# Patient Record
Sex: FEMALE | Race: OTHER | HISPANIC OR LATINO
[De-identification: names, ages, dates, MRNs, and addresses within clinical notes are randomized per-mention and may not be internally consistent; named-entity substitution may affect disease eponyms.]

---

## 2017-06-14 ENCOUNTER — APPOINTMENT (OUTPATIENT)
Dept: SURGERY | Facility: CLINIC | Age: 47
End: 2017-06-14

## 2017-06-14 VITALS
HEART RATE: 69 BPM | SYSTOLIC BLOOD PRESSURE: 109 MMHG | RESPIRATION RATE: 16 BRPM | OXYGEN SATURATION: 97 % | HEIGHT: 64 IN | DIASTOLIC BLOOD PRESSURE: 71 MMHG | WEIGHT: 170 LBS | BODY MASS INDEX: 29.02 KG/M2 | TEMPERATURE: 97.9 F

## 2017-06-14 DIAGNOSIS — R10.9 UNSPECIFIED ABDOMINAL PAIN: ICD-10-CM

## 2017-06-14 DIAGNOSIS — Z82.49 FAMILY HISTORY OF ISCHEMIC HEART DISEASE AND OTHER DISEASES OF THE CIRCULATORY SYSTEM: ICD-10-CM

## 2017-06-14 DIAGNOSIS — K51.90 ULCERATIVE COLITIS, UNSPECIFIED, W/OUT COMPLICATIONS: ICD-10-CM

## 2017-06-14 DIAGNOSIS — Z84.89 FAMILY HISTORY OF OTHER SPECIFIED CONDITIONS: ICD-10-CM

## 2017-06-14 RX ORDER — INFLIXIMAB 100 MG/10ML
100 INJECTION, POWDER, LYOPHILIZED, FOR SOLUTION INTRAVENOUS
Refills: 0 | Status: ACTIVE | COMMUNITY

## 2017-07-19 ENCOUNTER — OUTPATIENT (OUTPATIENT)
Dept: OUTPATIENT SERVICES | Facility: HOSPITAL | Age: 47
LOS: 1 days | End: 2017-07-19
Payer: COMMERCIAL

## 2017-07-19 VITALS
SYSTOLIC BLOOD PRESSURE: 122 MMHG | OXYGEN SATURATION: 100 % | RESPIRATION RATE: 15 BRPM | HEIGHT: 64 IN | DIASTOLIC BLOOD PRESSURE: 78 MMHG | WEIGHT: 173.06 LBS | HEART RATE: 64 BPM | TEMPERATURE: 98 F

## 2017-07-19 DIAGNOSIS — K82.8 OTHER SPECIFIED DISEASES OF GALLBLADDER: ICD-10-CM

## 2017-07-19 DIAGNOSIS — Z01.818 ENCOUNTER FOR OTHER PREPROCEDURAL EXAMINATION: ICD-10-CM

## 2017-07-19 DIAGNOSIS — C73 MALIGNANT NEOPLASM OF THYROID GLAND: ICD-10-CM

## 2017-07-19 DIAGNOSIS — K80.20 CALCULUS OF GALLBLADDER WITHOUT CHOLECYSTITIS WITHOUT OBSTRUCTION: ICD-10-CM

## 2017-07-19 DIAGNOSIS — K51.80 OTHER ULCERATIVE COLITIS WITHOUT COMPLICATIONS: ICD-10-CM

## 2017-07-19 DIAGNOSIS — Z98.890 OTHER SPECIFIED POSTPROCEDURAL STATES: Chronic | ICD-10-CM

## 2017-07-19 LAB
ALBUMIN SERPL ELPH-MCNC: 4 G/DL — SIGNIFICANT CHANGE UP (ref 3.3–5)
ALP SERPL-CCNC: 34 U/L — LOW (ref 40–120)
ALT FLD-CCNC: 16 U/L — SIGNIFICANT CHANGE UP (ref 10–45)
ANION GAP SERPL CALC-SCNC: 13 MMOL/L — SIGNIFICANT CHANGE UP (ref 5–17)
AST SERPL-CCNC: 28 U/L — SIGNIFICANT CHANGE UP (ref 10–40)
BILIRUB SERPL-MCNC: 0.3 MG/DL — SIGNIFICANT CHANGE UP (ref 0.2–1.2)
BUN SERPL-MCNC: 11 MG/DL — SIGNIFICANT CHANGE UP (ref 7–23)
CALCIUM SERPL-MCNC: 9.1 MG/DL — SIGNIFICANT CHANGE UP (ref 8.4–10.5)
CHLORIDE SERPL-SCNC: 104 MMOL/L — SIGNIFICANT CHANGE UP (ref 96–108)
CO2 SERPL-SCNC: 23 MMOL/L — SIGNIFICANT CHANGE UP (ref 22–31)
CREAT SERPL-MCNC: 0.74 MG/DL — SIGNIFICANT CHANGE UP (ref 0.5–1.3)
GLUCOSE SERPL-MCNC: 81 MG/DL — SIGNIFICANT CHANGE UP (ref 70–99)
HCT VFR BLD CALC: 31 % — LOW (ref 34.5–45)
HGB BLD-MCNC: 9.1 G/DL — LOW (ref 11.5–15.5)
MCHC RBC-ENTMCNC: 18.7 PG — LOW (ref 27–34)
MCHC RBC-ENTMCNC: 29.4 GM/DL — LOW (ref 32–36)
MCV RBC AUTO: 63.8 FL — LOW (ref 80–100)
PLATELET # BLD AUTO: 287 K/UL — SIGNIFICANT CHANGE UP (ref 150–400)
POTASSIUM SERPL-MCNC: 4.2 MMOL/L — SIGNIFICANT CHANGE UP (ref 3.5–5.3)
POTASSIUM SERPL-SCNC: 4.2 MMOL/L — SIGNIFICANT CHANGE UP (ref 3.5–5.3)
PROT SERPL-MCNC: 7.1 G/DL — SIGNIFICANT CHANGE UP (ref 6–8.3)
RBC # BLD: 4.86 M/UL — SIGNIFICANT CHANGE UP (ref 3.8–5.2)
RBC # FLD: 16.6 % — HIGH (ref 10.3–14.5)
SODIUM SERPL-SCNC: 140 MMOL/L — SIGNIFICANT CHANGE UP (ref 135–145)
WBC # BLD: 5.65 K/UL — SIGNIFICANT CHANGE UP (ref 3.8–10.5)
WBC # FLD AUTO: 5.65 K/UL — SIGNIFICANT CHANGE UP (ref 3.8–10.5)

## 2017-07-19 PROCEDURE — 80053 COMPREHEN METABOLIC PANEL: CPT

## 2017-07-19 PROCEDURE — G0463: CPT

## 2017-07-19 PROCEDURE — 85027 COMPLETE CBC AUTOMATED: CPT

## 2017-07-19 NOTE — H&P PST ADULT - HISTORY OF PRESENT ILLNESS
45 y/o female with pmh of papillary thyroid ca s/p total thyroidectomy, ulcerative colitis diagnosed in Nov 2016 now on Remicade infusions every 8 weeks, 7 months of RUQ pain radiating into her back and right shoulder - unprovoked by food. Presents for laparoscopic cholecystectomy. 45 y/o female with pmh of papillary thyroid ca s/p total thyroidectomy, ulcerative colitis diagnosed in Nov 2016 now on Remicade infusions every 8 weeks, 7 months of frequent RUQ pain radiating into her back and right shoulder - unprovoked by food. Presents for laparoscopic cholecystectomy.

## 2017-07-19 NOTE — H&P PST ADULT - NSANTHOSAYNRD_GEN_A_CORE
No. KRIS screening performed.  STOP BANG Legend: 0-2 = LOW Risk; 3-4 = INTERMEDIATE Risk; 5-8 = HIGH Risk/15 No. KRIS screening performed.  STOP BANG Legend: 0-2 = LOW Risk; 3-4 = INTERMEDIATE Risk; 5-8 = HIGH Risk/15 in

## 2017-07-19 NOTE — H&P PST ADULT - PMH
Anemia  s/p blood tx in 11/2016  Asthma  last rescue inhaler use one year ago; no hospitalizations in the past  Cardiac Murmur    Cholecystolithiasis    Heart Palpitations  occasional random episodes of palpitations  Other ulcerative colitis without complication    Thyroid Malignant Neoplasm Anemia  s/p blood tx in 11/2016  r/t UC  Asthma  last rescue inhaler use one year ago; no hospitalizations in the past  Cardiac Murmur    Cholecystolithiasis    Heart Palpitations  occasional random episodes of palpitations; feels it more when her H&H drops  Other ulcerative colitis without complication    Papillary thyroid carcinoma

## 2017-07-25 ENCOUNTER — INPATIENT (INPATIENT)
Facility: HOSPITAL | Age: 47
LOS: 0 days | Discharge: HOME | DRG: 419 | End: 2017-07-26
Attending: SURGERY | Admitting: SURGERY
Payer: COMMERCIAL

## 2017-07-25 ENCOUNTER — APPOINTMENT (OUTPATIENT)
Dept: SURGERY | Facility: HOSPITAL | Age: 47
End: 2017-07-25
Payer: COMMERCIAL

## 2017-07-25 ENCOUNTER — RESULT REVIEW (OUTPATIENT)
Age: 47
End: 2017-07-25

## 2017-07-25 VITALS
DIASTOLIC BLOOD PRESSURE: 77 MMHG | WEIGHT: 173.06 LBS | OXYGEN SATURATION: 100 % | RESPIRATION RATE: 20 BRPM | HEART RATE: 82 BPM | SYSTOLIC BLOOD PRESSURE: 112 MMHG | TEMPERATURE: 98 F | HEIGHT: 64 IN

## 2017-07-25 DIAGNOSIS — K82.8 OTHER SPECIFIED DISEASES OF GALLBLADDER: ICD-10-CM

## 2017-07-25 DIAGNOSIS — Z98.890 OTHER SPECIFIED POSTPROCEDURAL STATES: Chronic | ICD-10-CM

## 2017-07-25 LAB — HCG UR QL: NEGATIVE — SIGNIFICANT CHANGE UP

## 2017-07-25 PROCEDURE — 88304 TISSUE EXAM BY PATHOLOGIST: CPT | Mod: 26

## 2017-07-25 PROCEDURE — 88342 IMHCHEM/IMCYTCHM 1ST ANTB: CPT | Mod: 26,59

## 2017-07-25 PROCEDURE — 49585: CPT | Mod: 59

## 2017-07-25 PROCEDURE — 47562 LAPAROSCOPIC CHOLECYSTECTOMY: CPT

## 2017-07-25 PROCEDURE — 88360 TUMOR IMMUNOHISTOCHEM/MANUAL: CPT | Mod: 26

## 2017-07-25 PROCEDURE — 44970 LAPAROSCOPY APPENDECTOMY: CPT | Mod: 59

## 2017-07-25 PROCEDURE — 88302 TISSUE EXAM BY PATHOLOGIST: CPT | Mod: 26

## 2017-07-25 PROCEDURE — 88341 IMHCHEM/IMCYTCHM EA ADD ANTB: CPT | Mod: 26,59

## 2017-07-25 RX ORDER — SODIUM CHLORIDE 9 MG/ML
1000 INJECTION, SOLUTION INTRAVENOUS
Qty: 0 | Refills: 0 | Status: DISCONTINUED | OUTPATIENT
Start: 2017-07-25 | End: 2017-07-26

## 2017-07-25 RX ORDER — OXYCODONE HYDROCHLORIDE 5 MG/1
1 TABLET ORAL
Qty: 10 | Refills: 0
Start: 2017-07-25

## 2017-07-25 RX ORDER — LIDOCAINE HCL 20 MG/ML
0.2 VIAL (ML) INJECTION ONCE
Qty: 0 | Refills: 0 | Status: DISCONTINUED | OUTPATIENT
Start: 2017-07-25 | End: 2017-07-25

## 2017-07-25 RX ORDER — OXYCODONE AND ACETAMINOPHEN 5; 325 MG/1; MG/1
2 TABLET ORAL EVERY 4 HOURS
Qty: 0 | Refills: 0 | Status: DISCONTINUED | OUTPATIENT
Start: 2017-07-25 | End: 2017-07-26

## 2017-07-25 RX ORDER — HYDROMORPHONE HYDROCHLORIDE 2 MG/ML
0.5 INJECTION INTRAMUSCULAR; INTRAVENOUS; SUBCUTANEOUS
Qty: 0 | Refills: 0 | Status: DISCONTINUED | OUTPATIENT
Start: 2017-07-25 | End: 2017-07-25

## 2017-07-25 RX ORDER — OXYCODONE AND ACETAMINOPHEN 5; 325 MG/1; MG/1
1 TABLET ORAL EVERY 4 HOURS
Qty: 0 | Refills: 0 | Status: DISCONTINUED | OUTPATIENT
Start: 2017-07-25 | End: 2017-07-26

## 2017-07-25 RX ORDER — ACETAMINOPHEN 500 MG
1000 TABLET ORAL ONCE
Qty: 0 | Refills: 0 | Status: COMPLETED | OUTPATIENT
Start: 2017-07-25 | End: 2017-07-25

## 2017-07-25 RX ORDER — HYDROMORPHONE HYDROCHLORIDE 2 MG/ML
0.5 INJECTION INTRAMUSCULAR; INTRAVENOUS; SUBCUTANEOUS
Qty: 0 | Refills: 0 | Status: DISCONTINUED | OUTPATIENT
Start: 2017-07-25 | End: 2017-07-26

## 2017-07-25 RX ORDER — SODIUM CHLORIDE 9 MG/ML
3 INJECTION INTRAMUSCULAR; INTRAVENOUS; SUBCUTANEOUS EVERY 8 HOURS
Qty: 0 | Refills: 0 | Status: DISCONTINUED | OUTPATIENT
Start: 2017-07-25 | End: 2017-07-25

## 2017-07-25 RX ORDER — CEFAZOLIN SODIUM 1 G
2000 VIAL (EA) INJECTION ONCE
Qty: 0 | Refills: 0 | Status: DISCONTINUED | OUTPATIENT
Start: 2017-07-25 | End: 2017-07-26

## 2017-07-25 RX ADMIN — SODIUM CHLORIDE 3 MILLILITER(S): 9 INJECTION INTRAMUSCULAR; INTRAVENOUS; SUBCUTANEOUS at 14:14

## 2017-07-25 RX ADMIN — HYDROMORPHONE HYDROCHLORIDE 0.5 MILLIGRAM(S): 2 INJECTION INTRAMUSCULAR; INTRAVENOUS; SUBCUTANEOUS at 20:17

## 2017-07-25 RX ADMIN — HYDROMORPHONE HYDROCHLORIDE 0.5 MILLIGRAM(S): 2 INJECTION INTRAMUSCULAR; INTRAVENOUS; SUBCUTANEOUS at 21:32

## 2017-07-25 RX ADMIN — HYDROMORPHONE HYDROCHLORIDE 0.5 MILLIGRAM(S): 2 INJECTION INTRAMUSCULAR; INTRAVENOUS; SUBCUTANEOUS at 21:02

## 2017-07-25 RX ADMIN — HYDROMORPHONE HYDROCHLORIDE 0.5 MILLIGRAM(S): 2 INJECTION INTRAMUSCULAR; INTRAVENOUS; SUBCUTANEOUS at 20:30

## 2017-07-25 RX ADMIN — Medication 1000 MILLIGRAM(S): at 20:17

## 2017-07-25 RX ADMIN — Medication 400 MILLIGRAM(S): at 19:47

## 2017-07-25 RX ADMIN — HYDROMORPHONE HYDROCHLORIDE 0.5 MILLIGRAM(S): 2 INJECTION INTRAMUSCULAR; INTRAVENOUS; SUBCUTANEOUS at 20:00

## 2017-07-25 RX ADMIN — SODIUM CHLORIDE 50 MILLILITER(S): 9 INJECTION, SOLUTION INTRAVENOUS at 19:48

## 2017-07-25 RX ADMIN — HYDROMORPHONE HYDROCHLORIDE 0.5 MILLIGRAM(S): 2 INJECTION INTRAMUSCULAR; INTRAVENOUS; SUBCUTANEOUS at 19:47

## 2017-07-25 NOTE — ASU DISCHARGE PLAN (ADULT/PEDIATRIC). - MEDICATION SUMMARY - MEDICATIONS TO TAKE
I will START or STAY ON the medications listed below when I get home from the hospital:    predniSONE 10 mg oral tablet  -- 1 tab(s) by mouth once a day  -- Indication: For ulcerative colitis    acetaminophen-oxycodone 325 mg-5 mg oral tablet  -- 1-2 tab(s) by mouth every 4-6 hours, As needed, Moderate Pain (4 - 6) MDD:6  -- Indication: For pain control    Remicade 100 mg intravenous injection  --  intravenous every 8 weeks  -- Indication: For ulcerative colitis    Feosol  -- 325 milligram(s) by mouth 2 times a day  -- Indication: For iron supplementation    levothyroxine 125 mcg (0.125 mg) oral tablet  -- 1 tab(s) by mouth once a day  -- Indication: For hypothyroidism

## 2017-07-25 NOTE — BRIEF OPERATIVE NOTE - OPERATION/FINDINGS
-edematous gallbladder  -subhepatic appendix w/hyperemia  -small umbilical hernia    cystic artery and duct identified, critical view obtained, artery and duct clipped and cut, gallbladder removed; appendix found in subhepatic location and found to be slightly injected; removed with endo CARMEN purple and gray loads; small umbilical defect closed primarily

## 2017-07-25 NOTE — BRIEF OPERATIVE NOTE - POST-OP DX
Appendicitis  07/25/2017    Yareli Pedraza  Symptomatic cholelithiasis  07/25/2017    Yareli Pedraza  Umbilical hernia  07/25/2017    Yareli Pedraza

## 2017-07-25 NOTE — BRIEF OPERATIVE NOTE - PROCEDURE
Umbilical hernia repair  07/25/2017    Active  [id number]  Laparoscopic appendectomy  07/25/2017    Active  [id number]  Laparoscopic cholecystectomy  07/25/2017    Active  [id number]

## 2017-07-25 NOTE — ASU DISCHARGE PLAN (ADULT/PEDIATRIC). - YOU WERE IN THE HOSPITAL FOR:
laparoscopic cholecystectomy (removal of gallbladder), laparoscopic appendectomy (removal of appendix), repair of umbilical hernia

## 2017-07-26 ENCOUNTER — TRANSCRIPTION ENCOUNTER (OUTPATIENT)
Age: 47
End: 2017-07-26

## 2017-07-26 VITALS
TEMPERATURE: 98 F | OXYGEN SATURATION: 100 % | DIASTOLIC BLOOD PRESSURE: 62 MMHG | HEART RATE: 62 BPM | SYSTOLIC BLOOD PRESSURE: 113 MMHG | RESPIRATION RATE: 16 BRPM

## 2017-07-26 PROCEDURE — 88360 TUMOR IMMUNOHISTOCHEM/MANUAL: CPT

## 2017-07-26 PROCEDURE — 81025 URINE PREGNANCY TEST: CPT

## 2017-07-26 PROCEDURE — 88341 IMHCHEM/IMCYTCHM EA ADD ANTB: CPT

## 2017-07-26 PROCEDURE — 88302 TISSUE EXAM BY PATHOLOGIST: CPT

## 2017-07-26 PROCEDURE — 88342 IMHCHEM/IMCYTCHM 1ST ANTB: CPT

## 2017-07-26 PROCEDURE — 88304 TISSUE EXAM BY PATHOLOGIST: CPT

## 2017-07-26 RX ORDER — HEPARIN SODIUM 5000 [USP'U]/ML
5000 INJECTION INTRAVENOUS; SUBCUTANEOUS EVERY 8 HOURS
Qty: 0 | Refills: 0 | Status: DISCONTINUED | OUTPATIENT
Start: 2017-07-26 | End: 2017-07-26

## 2017-07-26 RX ORDER — FERROUS SULFATE 325(65) MG
325 TABLET ORAL DAILY
Qty: 0 | Refills: 0 | Status: DISCONTINUED | OUTPATIENT
Start: 2017-07-26 | End: 2017-07-26

## 2017-07-26 RX ORDER — LEVOTHYROXINE SODIUM 125 MCG
125 TABLET ORAL DAILY
Qty: 0 | Refills: 0 | Status: DISCONTINUED | OUTPATIENT
Start: 2017-07-26 | End: 2017-07-26

## 2017-07-26 RX ORDER — DOCUSATE SODIUM 100 MG
100 CAPSULE ORAL THREE TIMES A DAY
Qty: 0 | Refills: 0 | Status: DISCONTINUED | OUTPATIENT
Start: 2017-07-26 | End: 2017-07-26

## 2017-07-26 RX ORDER — ACETAMINOPHEN 500 MG
650 TABLET ORAL ONCE
Qty: 0 | Refills: 0 | Status: DISCONTINUED | OUTPATIENT
Start: 2017-07-26 | End: 2017-07-26

## 2017-07-26 RX ADMIN — Medication 125 MICROGRAM(S): at 06:45

## 2017-07-26 RX ADMIN — HEPARIN SODIUM 5000 UNIT(S): 5000 INJECTION INTRAVENOUS; SUBCUTANEOUS at 06:45

## 2017-07-26 RX ADMIN — OXYCODONE AND ACETAMINOPHEN 1 TABLET(S): 5; 325 TABLET ORAL at 08:48

## 2017-07-26 NOTE — DISCHARGE NOTE ADULT - MEDICATION SUMMARY - MEDICATIONS TO TAKE
I will START or STAY ON the medications listed below when I get home from the hospital:    predniSONE 10 mg oral tablet  -- 1 tab(s) by mouth once a day  -- Indication: For Steroids    acetaminophen-oxycodone 325 mg-5 mg oral tablet  -- 1-2 tab(s) by mouth every 4-6 hours, As needed, Moderate Pain (4 - 6) MDD:6  -- Indication: For Pain    Remicade 100 mg intravenous injection  --  intravenous every 8 weeks  -- Indication: For Immunosuppressive    Feosol  -- 325 milligram(s) by mouth 2 times a day  -- Indication: For Iron    levothyroxine 125 mcg (0.125 mg) oral tablet  -- 1 tab(s) by mouth once a day  -- Indication: For Hypothyroidism

## 2017-07-26 NOTE — DISCHARGE NOTE ADULT - CARE PROVIDER_API CALL
David Sheppard (MD), Surgery  [address] [facility] [place] [address]  Phone: [phone]  Fax: [phone]

## 2017-07-26 NOTE — PROGRESS NOTE ADULT - SUBJECTIVE AND OBJECTIVE BOX
DX:  Lap cholecystectomy, appendectomy, umbilical hernia repair  POD#: 1          SUBJECTIVE  Pt underwent Lap cholecystectomy, appendectomy, umbilical hernia repair, uncomplicated. Pt tolerated procedure well. Pt cleared for discharge but stayed in PACU overnight as did not feel comfortable leaving while still feeling fatigued. This AM pt denies N/V, states pain is well controlled.    10-point review of systems completed and negative except as noted above.    oxyCODONE    5 mG/acetaminophen 325 mG 1 Tablet(s) Oral every 4 hours PRN  oxyCODONE    5 mG/acetaminophen 325 mG 2 Tablet(s) Oral every 4 hours PRN  HYDROmorphone  Injectable 0.5 milliGRAM(s) IV Push every 10 minutes PRN  predniSONE   Tablet 10 milliGRAM(s) Oral daily  levothyroxine 125 MICROGram(s) Oral daily  ferrous    sulfate 325 milliGRAM(s) Oral daily  docusate sodium 100 milliGRAM(s) Oral three times a day  heparin  Injectable 5000 Unit(s) SubCutaneous every 8 hours            OBJECTIVE    T(C): 36.8 (07-26-17 @ 04:00), Max: 36.8 (07-26-17 @ 04:00)  HR: 50 (07-26-17 @ 04:00) (50 - 82)  BP: 125/68 (07-26-17 @ 04:00) (109/67 - 134/76)  RR: 16 (07-26-17 @ 04:00) (14 - 20)  SpO2: 100% (07-26-17 @ 04:00) (97% - 100%)    07-25-17 @ 07:01  -  07-26-17 @ 05:54  --------------------------------------------------------  IN: 550 mL / OUT: 200 mL / NET: 350 mL        EXAM  Gen: NAD, AAOx3  Pulm: b/l chest rise. No work on breathing  Card: RRR  Abd: soft, appropriately tender, ND. Dressings CDI.    LABS

## 2017-07-26 NOTE — DISCHARGE NOTE ADULT - CARE PLAN
Principal Discharge DX:	Umbilical hernia without obstruction and without gangrene  Goal:	Repair of umbilical hernia defect  Instructions for follow-up, activity and diet:	No diet restrictions  Secondary Diagnosis:	Appendicitis, unspecified chronicity  Goal:	Removal of appendix  Secondary Diagnosis:	Calculus of gallbladder with biliary obstruction but without cholecystitis  Goal:	Removal of gallbladder Principal Discharge DX:	Umbilical hernia without obstruction and without gangrene  Goal:	Repair of umbilical hernia defect  Instructions for follow-up, activity and diet:	Activity- No heavy lifting or straining over 15 lbs for the next two weeks;  Driving- Please do not drive until your pain is well controlled and you do not need to take pain medications.  You may shower-Do not submerge or scrub incision sites.  Please pat dry incisions/dressings.  Leave the white steri strips in place, they will fall off on their own in approximately 5-7 days.  Secondary Diagnosis:	Appendicitis, unspecified chronicity  Goal:	Removal of appendix  Secondary Diagnosis:	Calculus of gallbladder with biliary obstruction but without cholecystitis  Goal:	Removal of gallbladder

## 2017-07-26 NOTE — DISCHARGE NOTE ADULT - NS AS ACTIVITY OBS
No Heavy lifting/straining Showering allowed/No Heavy lifting/straining/Do not drive or operate machinery

## 2017-07-26 NOTE — PROGRESS NOTE ADULT - SUBJECTIVE AND OBJECTIVE BOX
[facility] GENERAL SURGERY POST-OP NOTE    SUBJECTIVE: Pt seen and evaluated at bedside. Resting comfortably in bed. Pain controlled. Denies nausea/vomiting, CP, palpitations, SOB, lightheaded, dizziness. Voiding w/ suresh. NPO. Plan was to discharge to home s/p operation but patient and family did not feel comfortable leaving.     Objective:  Gen: NAD, AAOx3  Pulm: b/l chest rise. No work on breathing  Card: RRR  Abd: soft, appropriately tender, ND. Dressings CDI.    Vital Signs Last 24 Hrs  T(C): 36.3 (25 Jul 2017 23:00), Max: 36.6 (25 Jul 2017 13:48)  T(F): 97.3 (25 Jul 2017 23:00), Max: 97.9 (25 Jul 2017 13:48)  HR: 68 (25 Jul 2017 23:00) (60 - 82)  BP: 109/67 (25 Jul 2017 23:00) (109/67 - 134/76)  BP(mean): 84 (25 Jul 2017 23:00) (84 - 99)  RR: 16 (25 Jul 2017 23:00) (14 - 20)  SpO2: 97% (25 Jul 2017 23:00) (97% - 100%)  I&O's Summary    25 Jul 2017 07:01  -  26 Jul 2017 00:02  --------------------------------------------------------  IN: 300 mL / OUT: 0 mL / NET: 300 mL      I&O's Detail    25 Jul 2017 07:01  -  26 Jul 2017 00:02  --------------------------------------------------------  IN:    lactated ringers.: 300 mL  Total IN: 300 mL    OUT:  Total OUT: 0 mL    Total NET: 300 mL          MEDICATIONS  (STANDING):  ceFAZolin   IVPB 2000 milliGRAM(s) IV Intermittent once  lactated ringers. 1000 milliLiter(s) (50 mL/Hr) IV Continuous <Continuous>    MEDICATIONS  (PRN):  oxyCODONE    5 mG/acetaminophen 325 mG 1 Tablet(s) Oral every 4 hours PRN Moderate Pain (4 - 6)  oxyCODONE    5 mG/acetaminophen 325 mG 2 Tablet(s) Oral every 4 hours PRN Severe Pain (7 - 10)  HYDROmorphone  Injectable 0.5 milliGRAM(s) IV Push every 10 minutes PRN Moderate Pain      LABS:                RADIOLOGY & ADDITIONAL STUDIES:      A/P: 47 yo lady s/p lap cholecystectomy, lap appendectomy, umbilical hernia repair in stable condition:  - Pain control  - Strict I/O's  - F/u GI fxn  - OOB/DVT ppx  - F/u AM labs

## 2017-07-26 NOTE — DISCHARGE NOTE ADULT - ADDITIONAL INSTRUCTIONS
Keep dressing dry. Additional dressing instructions: Leave umbilical dressing on for 48 hours. Do not soak incisions. Leave steri-strips intact. Remove dressing in 48 hours. Leave steri-strip intact. Please follow up with Dr. Sheppard in 2 weeks.

## 2017-07-26 NOTE — PROGRESS NOTE ADULT - ATTENDING COMMENTS
Agree with resident assessment and plan    Mild RUQ discomfort; no c/o nausea    Abd:  Soft, nondistended, min RUQ tenderness. Trocar sites clean    Plan:  Advance diet  Discharge later today when tolerating diet

## 2017-07-26 NOTE — DISCHARGE NOTE ADULT - HOSPITAL COURSE
46yoF PMH papillary thyroic ca s/p total thyroidectomy, UC on remicaide, presented with 7 month hx of frequent RUQ, cholelithiasis. Patient underwent lap cholecystectomy, appendectomy, umbilical hernia repair on 7/25. Procedure was uncomplicated. Patient tolerated procedure well. Patient stayed in PACU overnight, tolerated CLD and remained hemodynamically stable. Patient discharged on 7/26. 46yoF PMH papillary thyroic ca s/p total thyroidectomy, UC on remicaide, presented with 7 month hx of frequent RUQ, cholelithiasis. Patient underwent lap cholecystectomy, appendectomy, umbilical hernia repair on 7/25. Procedure was uncomplicated. Patient tolerated procedure well. Patient stayed in PACU overnight, tolerated CLD and remained hemodynamically stable. Diet was advanced and Patient discharged on 7/26.

## 2017-07-26 NOTE — DISCHARGE NOTE ADULT - PLAN OF CARE
Repair of umbilical hernia defect No diet restrictions Removal of appendix Removal of gallbladder Activity- No heavy lifting or straining over 15 lbs for the next two weeks;  Driving- Please do not drive until your pain is well controlled and you do not need to take pain medications.  You may shower-Do not submerge or scrub incision sites.  Please pat dry incisions/dressings.  Leave the white steri strips in place, they will fall off on their own in approximately 5-7 days.

## 2017-07-26 NOTE — PROGRESS NOTE ADULT - ASSESSMENT
47 yo lady POD 1 s/p lap cholecystectomy, lap appendectomy, umbilical hernia repair, currently hemodynamically stable.  - Pain control  - Strict I/O's  - F/u GI fxn  - OOB/DVT ppx  - Dispo: D/c today

## 2017-07-26 NOTE — DISCHARGE NOTE ADULT - PATIENT PORTAL LINK FT
“You can access the Follow[facility] Patient Portal, offered by [facility], by registering with the following website: [email or web address]”

## 2017-07-26 NOTE — DISCHARGE NOTE ADULT - SECONDARY DIAGNOSIS.
Appendicitis, unspecified chronicity Calculus of gallbladder with biliary obstruction but without cholecystitis

## 2017-08-02 LAB — SURGICAL PATHOLOGY STUDY: SIGNIFICANT CHANGE UP

## 2017-08-16 ENCOUNTER — APPOINTMENT (OUTPATIENT)
Dept: SURGERY | Facility: CLINIC | Age: 47
End: 2017-08-16
Payer: COMMERCIAL

## 2017-08-16 VITALS
HEART RATE: 68 BPM | RESPIRATION RATE: 15 BRPM | OXYGEN SATURATION: 100 % | TEMPERATURE: 98 F | DIASTOLIC BLOOD PRESSURE: 74 MMHG | SYSTOLIC BLOOD PRESSURE: 114 MMHG

## 2017-08-16 DIAGNOSIS — K82.8 OTHER SPECIFIED DISEASES OF GALLBLADDER: ICD-10-CM

## 2017-08-16 PROCEDURE — 99024 POSTOP FOLLOW-UP VISIT: CPT

## 2017-08-16 RX ORDER — ALBUTEROL SULFATE 90 UG/1
108 (90 BASE) AEROSOL, METERED RESPIRATORY (INHALATION)
Qty: 8 | Refills: 0 | Status: DISCONTINUED | COMMUNITY
Start: 2017-07-21

## 2017-08-16 RX ORDER — OXYCODONE AND ACETAMINOPHEN 5; 325 MG/1; MG/1
5-325 TABLET ORAL
Qty: 10 | Refills: 0 | Status: DISCONTINUED | COMMUNITY
Start: 2017-07-25

## 2017-08-16 RX ORDER — HYDROCORTISONE 100 MG/60ML
100 ENEMA RECTAL
Qty: 1680 | Refills: 0 | Status: DISCONTINUED | COMMUNITY
Start: 2017-03-09

## 2017-09-13 ENCOUNTER — APPOINTMENT (OUTPATIENT)
Dept: SURGERY | Facility: CLINIC | Age: 47
End: 2017-09-13
Payer: COMMERCIAL

## 2017-09-13 VITALS
TEMPERATURE: 98 F | HEART RATE: 65 BPM | SYSTOLIC BLOOD PRESSURE: 118 MMHG | DIASTOLIC BLOOD PRESSURE: 82 MMHG | OXYGEN SATURATION: 100 % | RESPIRATION RATE: 16 BRPM

## 2017-09-13 PROCEDURE — 99024 POSTOP FOLLOW-UP VISIT: CPT

## 2019-02-27 ENCOUNTER — EMERGENCY (EMERGENCY)
Facility: HOSPITAL | Age: 49
LOS: 1 days | Discharge: HOME | End: 2019-02-27
Attending: EMERGENCY MEDICINE
Payer: COMMERCIAL

## 2019-02-27 VITALS
DIASTOLIC BLOOD PRESSURE: 85 MMHG | RESPIRATION RATE: 18 BRPM | TEMPERATURE: 98 F | HEART RATE: 78 BPM | SYSTOLIC BLOOD PRESSURE: 127 MMHG | OXYGEN SATURATION: 98 % | HEIGHT: 64 IN

## 2019-02-27 DIAGNOSIS — Z98.890 OTHER SPECIFIED POSTPROCEDURAL STATES: Chronic | ICD-10-CM

## 2019-02-27 PROCEDURE — 99284 EMERGENCY DEPT VISIT MOD MDM: CPT | Mod: 25

## 2019-02-28 VITALS
SYSTOLIC BLOOD PRESSURE: 110 MMHG | TEMPERATURE: 98 F | DIASTOLIC BLOOD PRESSURE: 75 MMHG | OXYGEN SATURATION: 100 % | RESPIRATION RATE: 18 BRPM | HEART RATE: 77 BPM

## 2019-02-28 PROBLEM — K80.20 CALCULUS OF GALLBLADDER WITHOUT CHOLECYSTITIS WITHOUT OBSTRUCTION: Chronic | Status: ACTIVE | Noted: 2017-07-19

## 2019-02-28 LAB
ALBUMIN SERPL ELPH-MCNC: 4.5 G/DL — SIGNIFICANT CHANGE UP (ref 3.3–5)
ALP SERPL-CCNC: 42 U/L — SIGNIFICANT CHANGE UP (ref 40–120)
ALT FLD-CCNC: 9 U/L — LOW (ref 10–45)
ANION GAP SERPL CALC-SCNC: 13 MMOL/L — SIGNIFICANT CHANGE UP (ref 5–17)
AST SERPL-CCNC: 13 U/L — SIGNIFICANT CHANGE UP (ref 10–40)
BASOPHILS # BLD AUTO: 0.1 K/UL — SIGNIFICANT CHANGE UP (ref 0–0.2)
BASOPHILS NFR BLD AUTO: 0.7 % — SIGNIFICANT CHANGE UP (ref 0–2)
BILIRUB SERPL-MCNC: 0.3 MG/DL — SIGNIFICANT CHANGE UP (ref 0.2–1.2)
BLD GP AB SCN SERPL QL: NEGATIVE — SIGNIFICANT CHANGE UP
BUN SERPL-MCNC: 7 MG/DL — SIGNIFICANT CHANGE UP (ref 7–23)
CALCIUM SERPL-MCNC: 9.5 MG/DL — SIGNIFICANT CHANGE UP (ref 8.4–10.5)
CHLORIDE SERPL-SCNC: 102 MMOL/L — SIGNIFICANT CHANGE UP (ref 96–108)
CO2 SERPL-SCNC: 22 MMOL/L — SIGNIFICANT CHANGE UP (ref 22–31)
CREAT SERPL-MCNC: 0.69 MG/DL — SIGNIFICANT CHANGE UP (ref 0.5–1.3)
EOSINOPHIL # BLD AUTO: 0.1 K/UL — SIGNIFICANT CHANGE UP (ref 0–0.5)
EOSINOPHIL NFR BLD AUTO: 1.5 % — SIGNIFICANT CHANGE UP (ref 0–6)
GLUCOSE SERPL-MCNC: 94 MG/DL — SIGNIFICANT CHANGE UP (ref 70–99)
HCT VFR BLD CALC: 22.6 % — LOW (ref 34.5–45)
HCT VFR BLD CALC: 25.1 % — LOW (ref 34.5–45)
HGB BLD-MCNC: 7 G/DL — CRITICAL LOW (ref 11.5–15.5)
HGB BLD-MCNC: 7.6 G/DL — LOW (ref 11.5–15.5)
HYPOCHROMIA BLD QL: SLIGHT — SIGNIFICANT CHANGE UP
IRON SATN MFR SERPL: 13 UG/DL — LOW (ref 30–160)
IRON SATN MFR SERPL: 3 % — LOW (ref 14–50)
LYMPHOCYTES # BLD AUTO: 2.4 K/UL — SIGNIFICANT CHANGE UP (ref 1–3.3)
LYMPHOCYTES # BLD AUTO: 27.9 % — SIGNIFICANT CHANGE UP (ref 13–44)
MACROCYTES BLD QL: SLIGHT — SIGNIFICANT CHANGE UP
MCHC RBC-ENTMCNC: 16.9 PG — LOW (ref 27–34)
MCHC RBC-ENTMCNC: 17.3 PG — LOW (ref 27–34)
MCHC RBC-ENTMCNC: 30.3 GM/DL — LOW (ref 32–36)
MCHC RBC-ENTMCNC: 30.8 GM/DL — LOW (ref 32–36)
MCV RBC AUTO: 55.7 FL — LOW (ref 80–100)
MCV RBC AUTO: 56 FL — LOW (ref 80–100)
MICROCYTES BLD QL: SIGNIFICANT CHANGE UP
MONOCYTES # BLD AUTO: 0.9 K/UL — SIGNIFICANT CHANGE UP (ref 0–0.9)
MONOCYTES NFR BLD AUTO: 10 % — SIGNIFICANT CHANGE UP (ref 2–14)
NEUTROPHILS # BLD AUTO: 5.2 K/UL — SIGNIFICANT CHANGE UP (ref 1.8–7.4)
NEUTROPHILS NFR BLD AUTO: 59.9 % — SIGNIFICANT CHANGE UP (ref 43–77)
OVALOCYTES BLD QL SMEAR: SLIGHT — SIGNIFICANT CHANGE UP
PLAT MORPH BLD: NORMAL — SIGNIFICANT CHANGE UP
PLATELET # BLD AUTO: 367 K/UL — SIGNIFICANT CHANGE UP (ref 150–400)
PLATELET # BLD AUTO: 409 K/UL — HIGH (ref 150–400)
POTASSIUM SERPL-MCNC: 4.3 MMOL/L — SIGNIFICANT CHANGE UP (ref 3.5–5.3)
POTASSIUM SERPL-SCNC: 4.3 MMOL/L — SIGNIFICANT CHANGE UP (ref 3.5–5.3)
PROT SERPL-MCNC: 8.3 G/DL — SIGNIFICANT CHANGE UP (ref 6–8.3)
RBC # BLD: 4.04 M/UL — SIGNIFICANT CHANGE UP (ref 3.8–5.2)
RBC # BLD: 4.5 M/UL — SIGNIFICANT CHANGE UP (ref 3.8–5.2)
RBC # FLD: 18 % — HIGH (ref 10.3–14.5)
RBC # FLD: 18.1 % — HIGH (ref 10.3–14.5)
RBC BLD AUTO: ABNORMAL
RH IG SCN BLD-IMP: POSITIVE — SIGNIFICANT CHANGE UP
SCHISTOCYTES BLD QL AUTO: SLIGHT — SIGNIFICANT CHANGE UP
SODIUM SERPL-SCNC: 137 MMOL/L — SIGNIFICANT CHANGE UP (ref 135–145)
SPHEROCYTES BLD QL SMEAR: SLIGHT — SIGNIFICANT CHANGE UP
TARGETS BLD QL SMEAR: SLIGHT — SIGNIFICANT CHANGE UP
TIBC SERPL-MCNC: 436 UG/DL — HIGH (ref 220–430)
UIBC SERPL-MCNC: 423 UG/DL — HIGH (ref 110–370)
WBC # BLD: 7.5 K/UL — SIGNIFICANT CHANGE UP (ref 3.8–10.5)
WBC # BLD: 8.6 K/UL — SIGNIFICANT CHANGE UP (ref 3.8–10.5)
WBC # FLD AUTO: 7.5 K/UL — SIGNIFICANT CHANGE UP (ref 3.8–10.5)
WBC # FLD AUTO: 8.6 K/UL — SIGNIFICANT CHANGE UP (ref 3.8–10.5)

## 2019-02-28 PROCEDURE — 80053 COMPREHEN METABOLIC PANEL: CPT

## 2019-02-28 PROCEDURE — 83540 ASSAY OF IRON: CPT

## 2019-02-28 PROCEDURE — 85027 COMPLETE CBC AUTOMATED: CPT

## 2019-02-28 PROCEDURE — 99283 EMERGENCY DEPT VISIT LOW MDM: CPT

## 2019-02-28 PROCEDURE — 86850 RBC ANTIBODY SCREEN: CPT

## 2019-02-28 PROCEDURE — 86901 BLOOD TYPING SEROLOGIC RH(D): CPT

## 2019-02-28 PROCEDURE — 86900 BLOOD TYPING SEROLOGIC ABO: CPT

## 2019-02-28 PROCEDURE — 83550 IRON BINDING TEST: CPT

## 2019-02-28 NOTE — ED PROVIDER NOTE - OBJECTIVE STATEMENT
48F hx of UC on Remicade [id number], iron deficiency anemia, thyroid cancer s/p thyroidectomy on synthroid who presents for abnormal outpatient labs (Hb 6.4). Pt. had a scheduled Remicade injection today and prior to injection had labs drawn. Her GI called her due to Hb of 6.4 and suggested she present to the ED. Pt. feels that her UC symptoms have been under control, and denies diarrhea or abdominal pain. However, in regards to her iron deficiency anemia she intermittently takes iron pills which make her constipated, and have resulted in an anal fissure. Two weeks ago she noted hematochezia, however no further episodes since. Last BM this morning, brown in color. She feels fatigued which is close to baseline. Denies fevers, chills, cp, sob, abdominal pain, n/v/d, dysuria, hematuria, recent travel.     PMD - Nick Delarosa  GI - Dr. Shin Solis

## 2019-02-28 NOTE — ED ADULT NURSE NOTE - NSIMPLEMENTINTERV_GEN_ALL_ED
Implemented All Universal Safety Interventions:  [place] to call system. Call bell, personal items and telephone within reach. Instruct patient to call for assistance. Room bathroom lighting operational. Non-slip footwear when patient is off stretcher. Physically safe environment: no spills, clutter or unnecessary equipment. Stretcher in lowest position, wheels locked, appropriate side rails in place.

## 2019-02-28 NOTE — ED ADULT NURSE REASSESSMENT NOTE - NS ED NURSE REASSESS COMMENT FT1
Pt resting comfortably in bed in no apparent distress. Pt remains a&oX4, VSS. Pt spoke with her gastroenterologist on the phone and said it was OK to be discharged and to follow up with them. Awaiting dispo. safety maintained.

## 2019-02-28 NOTE — ED ADULT NURSE NOTE - OBJECTIVE STATEMENT
48y Female PMH Ulcerative Colitis on Remicade every 6 weeks, iron deficiency anemia and thyroid cancer with thyroidectomy presents to the ED c/o abnormal labs. Pt states she was at her gastroenterologists office today for Remicade injection when she has routine blood work done. Pt was called tonight for hemoglobin of 6.4 and was told to come to ED for transfusion. Pt states she has needed a transfusion in the past in 2016. Pt states that since she takes iron she gets constipated and has to strain to use the bathroom and that two weeks ago she noticed bleeding from a fissure. Pt states she bled a little bit for a week and then it resolved. Pt denies abd pain, diarrhea or blood in stool. Pt states she feels fatigued and light-headed at baseline and does not think those symptoms have gotten any worse. Pt a&oX4, ambulatory, well in appearance, airway intact, breathing spontaneously and unlabored, abd soft nondistended nontender to palpation gross neuro intact, follows commands, PERRL, skin warm dry and intact, cap refill <3 seconds, moving all extremities. Pt denies corona, CP, SOB. MD at bedside for eval. safety maintained.

## 2019-02-28 NOTE — ED PROVIDER NOTE - NSFOLLOWUPINSTRUCTIONS_ED_ALL_ED_FT
Please follow up with Dr. Solis within one week of discharge. If you develop bleeding per rectum, dark stools, stools with bright red blood, abdominal pain, chest pain, sob, please return to emergency room for further evaluation.

## 2019-02-28 NOTE — ED PROVIDER NOTE - ATTENDING CONTRIBUTION TO CARE
Afebrile. Awake and Alert. Lungs CTA. Heart RRR. Abdomen soft NTND. CN II-XII grossly intact. Moves all extremities without lateralization. Last blood streak BM 2 weeks ago. No lightheadedness, CP, SOB. Afebrile. Awake and Alert. Lungs CTA. Heart RRR. Abdomen soft NTND. CN II-XII grossly intact. Moves all extremities without lateralization. Last blood streak BM 2 weeks ago. No lightheadedness, CP, SOB. Hgb 7.6 baseline 7-9 per MR. Afebrile. Awake and Alert. Lungs CTA. Heart RRR. Abdomen soft NTND. CN II-XII grossly intact. Moves all extremities without lateralization. Last blood streak BM 2 weeks ago. No lightheadedness, CP, SOB. No active bleeding. Hgb 7.6 baseline 7-9 per MR. Discussed pros and cons of pRBC transfusion in setting of Hgb around 7 and near baseline, no active bleeding and HD stable, which pt also d/w her GI via phone, will defer pRBC at this time.

## 2019-02-28 NOTE — ED PROVIDER NOTE - CARE PROVIDER_API CALL
Shin Solis)  Gastroenterology  [facility], [address] [place] [address]  Phone: [phone]  Fax: [phone]  Follow Up Time:

## 2019-02-28 NOTE — ED PROVIDER NOTE - CLINICAL SUMMARY MEDICAL DECISION MAKING FREE TEXT BOX
48F hx of UC on Remicade q4wafce, iron deficiency anemia, thyroid cancer s/p thyroidectomy on synthroid who presents for abnormal outpatient labs (Hb 6.4). VSS. PE unremarkable. CBC and type and screen. 48F hx of UC on Remicade [id number], iron deficiency anemia, thyroid cancer s/p thyroidectomy on synthroid who presents for abnormal outpatient labs (Hb 6.4). VSS. PE unremarkable. Hb 7.6, repeat 7.0. Pt. discussed with outpatient GI, no need for transfusion, will follow up with repeat labs outpatient next week.

## 2019-02-28 NOTE — ED PROVIDER NOTE - PMH
Anemia  s/p blood tx in 11/2016  r/t UC  Asthma  last rescue inhaler use one year ago; no hospitalizations in the past  Cardiac Murmur    Cholecystolithiasis    Heart Palpitations  occasional random episodes of palpitations; feels it more when her H&H drops  Other ulcerative colitis without complication    Papillary thyroid carcinoma

## 2019-03-01 NOTE — ED POST DISCHARGE NOTE - ADDITIONAL DOCUMENTATION
Spoke with pt, she already knew the resuly followed up with Heme yesterday and is about to start iron infusions.  Micky

## 2019-03-01 NOTE — ED POST DISCHARGE NOTE - RESULT SUMMARY
Fe 13 (L), Unsaturated iron binding capacity 423 (H), TIBC 436 (H), %Fe sat 3 (L)- c/w Fe deficiency. In the setting of low h/h, known Fe deficient anemia intermittently uses iron pills due to constipation and anal fissure. Would call and inform pt, emphasize importance of f/u with Dr. Solis in 1 week, return to the ED if new/worsening sxs or concerns.

## 2019-09-24 NOTE — ASU DISCHARGE PLAN (ADULT/PEDIATRIC). - RN NAME (PRINT)_____________________________________________
Encounter Date: 9/24/2019    SCRIBE #1 NOTE: I, Monster Maki, am scribing for, and in the presence of,  Ananda Martell MD. I have scribed the following portions of the note - Other sections scribed: HPI, ROS, PE.       History     Chief Complaint   Patient presents with    Emesis     EMS reports pt has hx of ETOH abuse, been vomiting blood since this AM. Pt has associated weakness.      CC: Emesis    HPI: 66 y/o female, with a PMHx of EtOH abuse, GERD, thyroid disease, cirrhosis, and hepatitis C, presents to the ED c/o emesis. Pt reports that the emesis started last night. Pt states that she is vomiting blood and clots. Pt notes that she has experienced blood-tinged vomit before, but never pure blood. Additionally, pt adds that she is experiencing associated nausea. Pt denies attempting prior tx.     The history is provided by the patient. No  was used.     Review of patient's allergies indicates:  No Known Allergies  Past Medical History:   Diagnosis Date    Addiction to drug     Alcohol abuse     Arthritis     Cirrhosis of liver     Colon polyp     Coronary artery disease     Fall     GERD (gastroesophageal reflux disease)     Hepatitis C     States was successfully treated with Harvoni    History of psychiatric hospitalization     Hx of psychiatric care     Hypertension     Low back pain     Radha     MI (myocardial infarction)     Migraine headache     Psychiatric problem     Sleep difficulties     Suicide attempt     Therapy     Thyroid disease      Past Surgical History:   Procedure Laterality Date    ESOPHAGOGASTRODUODENOSCOPY N/A 3/20/2019    Procedure: EGD (ESOPHAGOGASTRODUODENOSCOPY);  Surgeon: Obdulia Wilson MD;  Location: [facility];  Service: Endoscopy;  Laterality: N/A;    HERNIA REPAIR      HIATAL HERNIA REPAIR      JOINT REPLACEMENT Bilateral     KNEE SURGERY  bilateral replacement    right foot sx  2008    SHOULDER SURGERY  replacement      Family History   Problem Relation Age of Onset    Cancer Mother     Cancer Father     Depression Sister     Bipolar disorder Maternal Grandfather     Suicide Cousin      Social History     Tobacco Use    Smoking status: Never Smoker    Smokeless tobacco: Never Used   Substance Use Topics    Alcohol use: Yes     Alcohol/week: 6.0 standard drinks     Types: 6 Cans of beer per week     Comment: pt admits to drinking vodka daily    Drug use: Not Currently     Types: Benzodiazepines, Methamphetamines, Amphetamines     Comment: [facility] clinic; admits to addiction to opioids     Review of Systems   Constitutional: Negative.    HENT: Negative.    Eyes: Negative.    Respiratory: Negative.    Cardiovascular: Negative.    Gastrointestinal: Positive for nausea and vomiting (blood and clots).   Genitourinary: Negative.    Musculoskeletal: Negative.    Skin: Negative.    Neurological: Negative.        Physical Exam     Initial Vitals [09/24/19 1729]   BP Pulse Resp Temp SpO2   (!) 160/90 (!) 120 20 97.9 °F (36.6 °C) 100 %      MAP       --         Physical Exam    Constitutional: She appears well-developed and well-nourished. She is not diaphoretic. No distress.   HENT:   Head: Normocephalic and atraumatic.   Nose: Nose normal.   Mouth/Throat: Oropharynx is clear and moist.   Eyes: EOM are normal. Pupils are equal, round, and reactive to light.   Neck: Normal range of motion. Neck supple. No JVD present.   Cardiovascular: Normal rate, regular rhythm and normal heart sounds.   No murmur heard.  Pulmonary/Chest: Breath sounds normal. No stridor. No respiratory distress. She has no wheezes. She has no rales.   Abdominal: Soft. Bowel sounds are normal. She exhibits no distension. There is tenderness in the epigastric area.   Musculoskeletal: Normal range of motion. She exhibits no edema or tenderness.   Neurological: She is alert and oriented to person, place, and time. No cranial nerve deficit.   Skin: Skin is warm and  dry. Capillary refill takes less than 2 seconds. No rash noted. No erythema.         ED Course   Procedures  Labs Reviewed   CBC W/ AUTO DIFFERENTIAL - Abnormal; Notable for the following components:       Result Value    WBC 2.34 (*)     Hemoglobin 11.6 (*)     Hematocrit 35.9 (*)     RDW 18.3 (*)     Lymph # 0.2 (*)     Mono # 0.2 (*)     Gran% 82.1 (*)     Lymph% 9.4 (*)     All other components within normal limits   COMPREHENSIVE METABOLIC PANEL - Abnormal; Notable for the following components:    Potassium 3.3 (*)     Glucose 153 (*)     Total Protein 9.0 (*)     Alkaline Phosphatase 231 (*)     AST 44 (*)     All other components within normal limits   URINALYSIS, REFLEX TO URINE CULTURE - Abnormal; Notable for the following components:    Protein, UA 1+ (*)     Glucose, UA 1+ (*)     Nitrite, UA Positive (*)     Urobilinogen, UA 4.0-6.0 (*)     All other components within normal limits    Narrative:     Preferred Collection Type->Urine, Clean Catch   LACTIC ACID, PLASMA - Abnormal; Notable for the following components:    Lactate (Lactic Acid) 3.2 (*)     All other components within normal limits   URINALYSIS MICROSCOPIC - Abnormal; Notable for the following components:    Bacteria Many (*)     Hyaline Casts, UA 2 (*)     All other components within normal limits    Narrative:     Preferred Collection Type->Urine, Clean Catch   ALCOHOL,MEDICAL (ETHANOL)   DRUG SCREEN PANEL, URINE EMERGENCY    Narrative:     Preferred Collection Type->Urine, Clean Catch   LIPASE   TROPONIN I   LACTIC ACID, PLASMA   TYPE & SCREEN   RH TYPING   ABO GROUP          Imaging Results          X-Ray Chest 1 View (Final result)  Result time 09/24/19 18:09:58    Final result by Paula Santos MD (09/24/19 18:09:58)                 Impression:      No acute cardiopulmonary process identified.      Electronically signed by: Paula Santos MD  Date:    09/24/2019  Time:    18:09             Narrative:    EXAMINATION:  XR CHEST 1  "VIEW    TECHNIQUE:  Single frontal view of the chest was performed.    COMPARISON:  08/27/2019.    FINDINGS:  Cardiac silhouette is normal in size.  Lungs are slightly hypoinflated.  No evidence of focal consolidative process, pneumothorax, or significant effusion.  No acute osseous abnormality identified.  Postsurgical changes are seen involving the right shoulder.                                   MDM:    68 y/o female, with a PMHx of EtOH abuse, GERD, thyroid disease, cirrhosis, and hepatitis C, presents to the ED c/o emesis.  Physical exam remarkable for moderately distressed appearing female, conversing with ease, abdomen soft, nt/nd, CTAB, tachycardic.  ED workup remarkable for Hgb - 11.6, CMP: K - 3.3, ETOH - negative, Lipase - 53, LA - 3.2, trop - negative, CXR - unremarkable. Pt presentation consistent with vomiting, suspect chronic process, with bright red blood in bag, consistent with errol-wolfe tear, not large volume hematemesis, with multiple prior admissions and ED visits for similar demanding pain medication and that she is in "DTs".  Pt observed for > 4.5 hrs in the ED without BM or further episode of vomiting with coffee-ground emesis noted.  Pt given pain medicaiton initially, suspect tachycardia 2/2 medication non-compliance and given home atenolol.  Discussed blood counts with patient and need for f/u with her PCP at this time, as there is nothing acute going on.  Pt given reglan tablets for further use at home for her nausea.  At this time given patient's history, physical exam, and ED workup do not suspect variceal bleed, acute GI blood loss, MI/ACS, PE, CVA/TIA, ETOH withdrawal, or any further malignant cause.  Discussed diagnosis and further treatment with patient, patient admitted to observation for further treatment.          Scribe Attestation:   Scribe #1: I performed the above scribed service and the documentation accurately describes the services I performed. I attest to the accuracy " of the note.               Clinical Impression:       ICD-10-CM ICD-9-CM   1. Intractable vomiting with nausea, unspecified vomiting type R11.2 536.2   2. Vomiting R11.10 787.03                   I, Ananda Martell M.D., personally performed the services described in this documentation. All medical record entries made by the scribe were at my direction and in my presence.  I have reviewed the chart and agree that the record reflects my personal performance and is accurate and complete.             Ananda Martell MD  09/24/19 2418     Statement Selected

## 2019-10-22 ENCOUNTER — APPOINTMENT (OUTPATIENT)
Dept: SURGERY | Facility: CLINIC | Age: 49
End: 2019-10-22
Payer: COMMERCIAL

## 2019-10-22 VITALS
OXYGEN SATURATION: 100 % | WEIGHT: 138 LBS | DIASTOLIC BLOOD PRESSURE: 83 MMHG | HEART RATE: 68 BPM | HEIGHT: 64 IN | SYSTOLIC BLOOD PRESSURE: 118 MMHG | BODY MASS INDEX: 23.56 KG/M2 | RESPIRATION RATE: 15 BRPM

## 2019-10-22 PROCEDURE — 99243 OFF/OP CNSLTJ NEW/EST LOW 30: CPT

## 2019-10-22 RX ORDER — LEVOTHYROXINE SODIUM 112 UG/1
112 TABLET ORAL
Refills: 0 | Status: ACTIVE | COMMUNITY

## 2019-10-22 RX ORDER — PREDNISOLONE ACETATE 10 MG/ML
1 SUSPENSION/ DROPS OPHTHALMIC
Refills: 0 | Status: ACTIVE | COMMUNITY

## 2019-10-22 RX ORDER — MESALAMINE 1.2 G/1
1.2 TABLET, DELAYED RELEASE ORAL
Qty: 90 | Refills: 0 | Status: DISCONTINUED | COMMUNITY
Start: 2017-05-12 | End: 2019-10-22

## 2019-10-22 RX ORDER — PREDNISONE 50 MG/1
TABLET ORAL
Refills: 0 | Status: DISCONTINUED | COMMUNITY
End: 2019-10-22

## 2019-10-22 RX ORDER — LEVOTHYROXINE SODIUM 125 UG/1
125 TABLET ORAL
Qty: 90 | Refills: 0 | Status: DISCONTINUED | COMMUNITY
Start: 2017-03-29 | End: 2019-10-22

## 2019-10-22 NOTE — REASON FOR VISIT
[Initial Evaluation] : an initial evaluation [FreeTextEntry1] : Lipoma on right side of the lower back.

## 2019-10-22 NOTE — ASSESSMENT
[FreeTextEntry1] : I discussed resection of this lipoma of the lower back with the patient. She understands will be done in the  facility at [facility] and be done under intravenous sedation and local.\par Risks benefits and alternatives were discussed with the patient. I told her that the major risks to include bleeding and infection..

## 2019-10-22 NOTE — PHYSICAL EXAM
[Normal Breath Sounds] : Normal breath sounds [Normal Heart Sounds] : normal heart sounds [Normal Rate and Rhythm] : normal rate and rhythm [Oriented to Place] : oriented to place [Oriented to Person] : oriented to person [Alert] : alert [Oriented to Time] : oriented to time [Calm] : calm [JVD] : no jugular venous distention  [de-identified] : Well nourished female, in no apparent distress [de-identified] : WNL [de-identified] : Full ROM [de-identified] : There is a 5 x 6 x 3 cm lipoma of the lower back.

## 2019-10-22 NOTE — HISTORY OF PRESENT ILLNESS
[de-identified] : Virgil is a 49 y/o female here for evaluation of lipoma of the back. He patient states that recently there has been a change in the size of this lipoma. It causes no pain.

## 2019-10-22 NOTE — CONSULT LETTER
[Dear  ___] : Dear  [unfilled], [Consult Letter:] : I had the pleasure of evaluating your patient, [unfilled]. [Please see my note below.] : Please see my note below. [Consult Closing:] : Thank you very much for allowing me to participate in the care of this patient.  If you have any questions, please do not hesitate to contact me. [Sincerely,] : Sincerely, [FreeTextEntry3] : I have reviewed all the documentation for this encounter with the patient and have edited where appropriate\par \par Dr. Kp Corral

## 2019-11-13 ENCOUNTER — OUTPATIENT (OUTPATIENT)
Dept: OUTPATIENT SERVICES | Facility: HOSPITAL | Age: 49
LOS: 1 days | End: 2019-11-13
Payer: COMMERCIAL

## 2019-11-13 VITALS
SYSTOLIC BLOOD PRESSURE: 106 MMHG | TEMPERATURE: 98 F | DIASTOLIC BLOOD PRESSURE: 78 MMHG | HEIGHT: 64 IN | RESPIRATION RATE: 76 BRPM | HEART RATE: 73 BPM | WEIGHT: 134.04 LBS | OXYGEN SATURATION: 100 %

## 2019-11-13 DIAGNOSIS — Z98.890 OTHER SPECIFIED POSTPROCEDURAL STATES: Chronic | ICD-10-CM

## 2019-11-13 DIAGNOSIS — D17.1 BENIGN LIPOMATOUS NEOPLASM OF SKIN AND SUBCUTANEOUS TISSUE OF TRUNK: ICD-10-CM

## 2019-11-13 DIAGNOSIS — D17.9 BENIGN LIPOMATOUS NEOPLASM, UNSPECIFIED: ICD-10-CM

## 2019-11-13 DIAGNOSIS — E03.9 HYPOTHYROIDISM, UNSPECIFIED: ICD-10-CM

## 2019-11-13 DIAGNOSIS — J32.9 CHRONIC SINUSITIS, UNSPECIFIED: ICD-10-CM

## 2019-11-13 DIAGNOSIS — Z01.818 ENCOUNTER FOR OTHER PREPROCEDURAL EXAMINATION: ICD-10-CM

## 2019-11-13 LAB
ANION GAP SERPL CALC-SCNC: 12 MMOL/L — SIGNIFICANT CHANGE UP (ref 5–17)
BUN SERPL-MCNC: 6 MG/DL — LOW (ref 7–23)
CALCIUM SERPL-MCNC: 9.1 MG/DL — SIGNIFICANT CHANGE UP (ref 8.4–10.5)
CHLORIDE SERPL-SCNC: 99 MMOL/L — SIGNIFICANT CHANGE UP (ref 96–108)
CO2 SERPL-SCNC: 26 MMOL/L — SIGNIFICANT CHANGE UP (ref 22–31)
CREAT SERPL-MCNC: 0.66 MG/DL — SIGNIFICANT CHANGE UP (ref 0.5–1.3)
GLUCOSE SERPL-MCNC: 96 MG/DL — SIGNIFICANT CHANGE UP (ref 70–99)
HCT VFR BLD CALC: 34.3 % — LOW (ref 34.5–45)
HGB BLD-MCNC: 10.6 G/DL — LOW (ref 11.5–15.5)
MCHC RBC-ENTMCNC: 23.1 PG — LOW (ref 27–34)
MCHC RBC-ENTMCNC: 30.9 GM/DL — LOW (ref 32–36)
MCV RBC AUTO: 74.7 FL — LOW (ref 80–100)
PLATELET # BLD AUTO: 278 K/UL — SIGNIFICANT CHANGE UP (ref 150–400)
POTASSIUM SERPL-MCNC: 3.8 MMOL/L — SIGNIFICANT CHANGE UP (ref 3.5–5.3)
POTASSIUM SERPL-SCNC: 3.8 MMOL/L — SIGNIFICANT CHANGE UP (ref 3.5–5.3)
RBC # BLD: 4.59 M/UL — SIGNIFICANT CHANGE UP (ref 3.8–5.2)
RBC # FLD: 13.9 % — SIGNIFICANT CHANGE UP (ref 10.3–14.5)
SODIUM SERPL-SCNC: 137 MMOL/L — SIGNIFICANT CHANGE UP (ref 135–145)
WBC # BLD: 7.89 K/UL — SIGNIFICANT CHANGE UP (ref 3.8–10.5)
WBC # FLD AUTO: 7.89 K/UL — SIGNIFICANT CHANGE UP (ref 3.8–10.5)

## 2019-11-13 PROCEDURE — 80048 BASIC METABOLIC PNL TOTAL CA: CPT

## 2019-11-13 PROCEDURE — G0463: CPT

## 2019-11-13 PROCEDURE — 85027 COMPLETE CBC AUTOMATED: CPT

## 2019-11-13 RX ORDER — LIDOCAINE HCL 20 MG/ML
0.2 VIAL (ML) INJECTION ONCE
Refills: 0 | Status: DISCONTINUED | OUTPATIENT
Start: 2019-12-18 | End: 2020-01-03

## 2019-11-13 RX ORDER — INFLIXIMAB-DYYB 120 MG/ML
0 INJECTION SUBCUTANEOUS
Qty: 0 | Refills: 0 | DISCHARGE

## 2019-11-13 RX ORDER — LEVOTHYROXINE SODIUM 125 MCG
1 TABLET ORAL
Qty: 0 | Refills: 0 | DISCHARGE

## 2019-11-13 RX ORDER — SODIUM CHLORIDE 9 MG/ML
3 INJECTION INTRAMUSCULAR; INTRAVENOUS; SUBCUTANEOUS EVERY 8 HOURS
Refills: 0 | Status: DISCONTINUED | OUTPATIENT
Start: 2019-12-18 | End: 2020-01-03

## 2019-11-13 NOTE — H&P PST ADULT - HISTORY OF PRESENT ILLNESS
45 y/o female with pmh of papillary thyroid ca s/p total thyroidectomy, ulcerative colitis diagnosed in Nov 2016 now on Remicade infusions every 8 weeks, 7 months of frequent RUQ pain radiating into her back and right shoulder - unprovoked by food. Presents for laparoscopic cholecystectomy. 50 yo female, PMH papillary thyroid CA s/p total thyroidectomy 11/2018, asthma - well controlled, mild KRIS - no CPAP required, laparoscopic cholecystectomy appendectomy, umbilical hernia surgery 7/2017, chronic anemia - received PRBC 2016, frequent iron infusions by hematologist, ulcerative colitis with acute episode 9/2019 and accompanied by iritis - presently on prednisone - no further symptoms, "pressure headaches" diagnosed with sinusitis 11/6 and under the care of ENT - presently on antibiotics. Pt. with lipoma for a few years that has been growing in size ~7X4 cm on right lower back, presents to PST for scheduled Excision of lipoma of the back on 11/20/19. Pt. denies fever, chills, chest pain, SOB, changes in bowel/urinary habits.    Pt. will see ENT pre-op for medical evaluation before surgery 48 yo female, PMH papillary thyroid CA s/p total thyroidectomy 11/2018, asthma - well controlled, mild KRIS - no CPAP required, laparoscopic cholecystectomy appendectomy, umbilical hernia surgery 7/2017, chronic anemia - received PRBC 2016, frequent iron infusions by hematologist, ulcerative colitis with acute episode 9/2019 and accompanied by iritis -  on prednisone - no further symptoms, "pressure headaches" diagnosed with sinusitis 11/6 and on antibiotics. Pt. with lipoma for a few years that has been growing in size ~7X4 cm on right lower back, presents to PST for scheduled Excision of lipoma of the back on 11/20/19. Pt.'s sinus symptoms have subsided, denies fever, chills, chest pain, SOB, changes in bowel/urinary habits.

## 2019-11-13 NOTE — H&P PST ADULT - NEGATIVE MUSCULOSKELETAL SYMPTOMS
no arthritis/no neck pain/no back pain no arthritis/no stiffness/no neck pain/no joint swelling/no back pain

## 2019-11-13 NOTE — H&P PST ADULT - OTHER CARE PROVIDERS
Endo [address] [place] [address]  Dr. Shin Solis, GI, [phone], Dr. Ondina Hoang, endo, [phone], Dr. Rudolph Gerber, hematologist

## 2019-11-13 NOTE — H&P PST ADULT - NEGATIVE ENMT SYMPTOMS
no dry mouth/no throat pain/no nose bleeds/no dysphagia/no nasal congestion no nose bleeds/no dry mouth/no tinnitus/no ear pain/no throat pain/no dysphagia/no vertigo/no hearing difficulty/no nasal congestion

## 2019-11-13 NOTE — H&P PST ADULT - LYMPHATIC
anterior cervical L/anterior cervical R/supraclavicular R/supraclavicular L anterior cervical L/anterior cervical R

## 2019-11-13 NOTE — H&P PST ADULT - GASTROINTESTINAL DETAILS
no distention/soft/nontender/bowel sounds normal bowel sounds normal/no distention/nontender/soft/no masses palpable

## 2019-11-13 NOTE — H&P PST ADULT - NSICDXPASTSURGICALHX_GEN_ALL_CORE_FT
PAST SURGICAL HISTORY:  S/P thyroid surgery thyroidectomy due to thyroid cancer  2010 PAST SURGICAL HISTORY:  S/P laparoscopic surgery cholecystectomym, appendectomy, umbilical hernia 7/2017    S/P thyroid surgery thyroidectomy due to thyroid cancer  2010

## 2019-11-13 NOTE — H&P PST ADULT - NEGATIVE OPHTHALMOLOGIC SYMPTOMS
no diplopia/no photophobia no blurred vision L/no discharge L/no blurred vision R/no discharge R/no diplopia/no photophobia

## 2019-11-13 NOTE — H&P PST ADULT - NSICDXPASTMEDICALHX_GEN_ALL_CORE_FT
PAST MEDICAL HISTORY:  Anemia s/p blood tx in 11/2016  r/t UC    Asthma last rescue inhaler use one year ago; no hospitalizations in the past    Cardiac Murmur     Cholecystolithiasis     Heart Palpitations occasional random episodes of palpitations; feels it more when her H&H drops    Other ulcerative colitis without complication     Papillary thyroid carcinoma PAST MEDICAL HISTORY:  Anemia s/p blood tx in 11/2016  r/t UC    Asthma last rescue inhaler 9/2018; no hospitalizations in the past    Cardiac Murmur no symptoms    Cholecystolithiasis     Heart Palpitations occasional random episodes of palpitations; feels it more when her H&H drops    KRIS (obstructive sleep apnea) mild, no CPAP required    Other ulcerative colitis without complication     Papillary thyroid carcinoma 2010

## 2019-11-13 NOTE — H&P PST ADULT - NSICDXPROBLEM_GEN_ALL_CORE_FT
PROBLEM DIAGNOSES  Problem: Lipoma  Assessment and Plan: Excision of lipoma of the back  Pre-op instructions, including Chlorhexidine soap, provided - all questions answered    Problem: Hypothyroid  Assessment and Plan: Continue Synthroid as directed, including am of surgery with sip of water    Problem: Sinusitis  Assessment and Plan: Pt. will see ENT pre-op for medical evaluation before surgery PROBLEM DIAGNOSES  Problem: Lipoma  Assessment and Plan: Excision of lipoma of the back  Pre-op instructions, including Chlorhexidine soap, provided - all questions answered    Problem: Hypothyroid  Assessment and Plan: Continue Synthroid as directed, including am of surgery with sip of water

## 2019-11-13 NOTE — H&P PST ADULT - PRIMARY CARE PROVIDER
Dr. Delarosa [phone] Dr. Delarosa [phone], Dr. Ángel Ortiz,  [phone] - will give m/e this week Dr. Delarosa [phone], Dr. Ángel Ortiz,  [phone]

## 2019-12-09 PROBLEM — G47.33 OBSTRUCTIVE SLEEP APNEA (ADULT) (PEDIATRIC): Chronic | Status: ACTIVE | Noted: 2019-11-13

## 2019-12-09 PROBLEM — C73 MALIGNANT NEOPLASM OF THYROID GLAND: Chronic | Status: ACTIVE | Noted: 2017-07-19

## 2019-12-17 ENCOUNTER — TRANSCRIPTION ENCOUNTER (OUTPATIENT)
Age: 49
End: 2019-12-17

## 2019-12-18 ENCOUNTER — APPOINTMENT (OUTPATIENT)
Dept: SURGERY | Facility: HOSPITAL | Age: 49
End: 2019-12-18
Payer: COMMERCIAL

## 2019-12-18 ENCOUNTER — RESULT REVIEW (OUTPATIENT)
Age: 49
End: 2019-12-18

## 2019-12-18 ENCOUNTER — OUTPATIENT (OUTPATIENT)
Dept: OUTPATIENT SERVICES | Facility: HOSPITAL | Age: 49
LOS: 1 days | End: 2019-12-18
Payer: COMMERCIAL

## 2019-12-18 VITALS
SYSTOLIC BLOOD PRESSURE: 114 MMHG | TEMPERATURE: 98 F | DIASTOLIC BLOOD PRESSURE: 76 MMHG | RESPIRATION RATE: 15 BRPM | WEIGHT: 134.04 LBS | HEIGHT: 64 IN | HEART RATE: 59 BPM | OXYGEN SATURATION: 100 %

## 2019-12-18 VITALS
SYSTOLIC BLOOD PRESSURE: 96 MMHG | DIASTOLIC BLOOD PRESSURE: 58 MMHG | TEMPERATURE: 98 F | HEART RATE: 58 BPM | OXYGEN SATURATION: 98 % | RESPIRATION RATE: 16 BRPM

## 2019-12-18 DIAGNOSIS — Z98.890 OTHER SPECIFIED POSTPROCEDURAL STATES: Chronic | ICD-10-CM

## 2019-12-18 DIAGNOSIS — D17.1 BENIGN LIPOMATOUS NEOPLASM OF SKIN AND SUBCUTANEOUS TISSUE OF TRUNK: ICD-10-CM

## 2019-12-18 DIAGNOSIS — Z01.818 ENCOUNTER FOR OTHER PREPROCEDURAL EXAMINATION: ICD-10-CM

## 2019-12-18 PROCEDURE — 88304 TISSUE EXAM BY PATHOLOGIST: CPT

## 2019-12-18 PROCEDURE — 21933 EXC BACK TUM DEEP 5 CM/>: CPT

## 2019-12-18 PROCEDURE — 88304 TISSUE EXAM BY PATHOLOGIST: CPT | Mod: 26

## 2019-12-18 RX ORDER — CELECOXIB 200 MG/1
200 CAPSULE ORAL ONCE
Refills: 0 | Status: COMPLETED | OUTPATIENT
Start: 2019-12-18 | End: 2019-12-18

## 2019-12-18 RX ORDER — LEVOCETIRIZINE DIHYDROCHLORIDE 0.5 MG/ML
1 SOLUTION ORAL
Qty: 0 | Refills: 0 | DISCHARGE

## 2019-12-18 RX ORDER — PREDNISOLONE SODIUM PHOSPHATE 1 %
1 DROPS OPHTHALMIC (EYE)
Qty: 0 | Refills: 0 | DISCHARGE

## 2019-12-18 RX ORDER — ACETAMINOPHEN 500 MG
1000 TABLET ORAL ONCE
Refills: 0 | Status: COMPLETED | OUTPATIENT
Start: 2019-12-18 | End: 2019-12-18

## 2019-12-18 RX ORDER — INFLIXIMAB-DYYB 120 MG/ML
0 INJECTION SUBCUTANEOUS
Qty: 0 | Refills: 0 | DISCHARGE

## 2019-12-18 RX ORDER — CHLORHEXIDINE GLUCONATE 213 G/1000ML
1 SOLUTION TOPICAL ONCE
Refills: 0 | Status: COMPLETED | OUTPATIENT
Start: 2019-12-18 | End: 2019-12-18

## 2019-12-18 RX ORDER — LEVOTHYROXINE SODIUM 125 MCG
1 TABLET ORAL
Qty: 0 | Refills: 0 | DISCHARGE

## 2019-12-18 RX ORDER — ALBUTEROL 90 UG/1
2 AEROSOL, METERED ORAL
Qty: 0 | Refills: 0 | DISCHARGE

## 2019-12-18 RX ORDER — FLUTICASONE PROPIONATE 50 MCG
1 SPRAY, SUSPENSION NASAL
Qty: 0 | Refills: 0 | DISCHARGE

## 2019-12-18 RX ORDER — SODIUM CHLORIDE 0.65 %
2 AEROSOL, SPRAY (ML) NASAL
Qty: 0 | Refills: 0 | DISCHARGE

## 2019-12-18 RX ADMIN — CHLORHEXIDINE GLUCONATE 1 APPLICATION(S): 213 SOLUTION TOPICAL at 10:08

## 2019-12-18 RX ADMIN — CELECOXIB 200 MILLIGRAM(S): 200 CAPSULE ORAL at 10:08

## 2019-12-18 RX ADMIN — Medication 1000 MILLIGRAM(S): at 10:08

## 2019-12-18 NOTE — ASU PATIENT PROFILE, ADULT - PMH
Anemia  s/p blood tx in 11/2016  r/t UC  Asthma  last rescue inhaler 9/2018; no hospitalizations in the past  Cardiac Murmur  no symptoms  Cholecystolithiasis    Heart Palpitations  occasional random episodes of palpitations; feels it more when her H&H drops  KRIS (obstructive sleep apnea)  mild, no CPAP required  Other ulcerative colitis without complication    Papillary thyroid carcinoma  2010

## 2019-12-18 NOTE — ASU DISCHARGE PLAN (ADULT/PEDIATRIC) - CALL YOUR DOCTOR IF YOU HAVE ANY OF THE FOLLOWING:
Wound/Surgical Site with redness, or foul smelling discharge or pus/Numbness, tingling, color or temperature change to extremity/Swelling that gets worse/Bleeding that does not stop/Pain not relieved by Medications/Fever greater than (need to indicate Fahrenheit or Celsius) Pain not relieved by Medications/Numbness, tingling, color or temperature change to extremity/Swelling that gets worse/Wound/Surgical Site with redness, or foul smelling discharge or pus/Bleeding that does not stop/Unable to urinate/Fever greater than (need to indicate Fahrenheit or Celsius)

## 2019-12-18 NOTE — ASU PATIENT PROFILE, ADULT - PSH
S/P laparoscopic surgery  cholecystectomym, appendectomy, umbilical hernia 7/2017  S/P thyroid surgery  thyroidectomy due to thyroid cancer  2010

## 2019-12-18 NOTE — BRIEF OPERATIVE NOTE - NSICDXBRIEFPROCEDURE_GEN_ALL_CORE_FT
PROCEDURES:  Excision of benign skin lesion (excluding skin tags) of back, 1.1 to 1.5cm 18-Dec-2019 12:57:22  Ryne Austin

## 2019-12-18 NOTE — ASU DISCHARGE PLAN (ADULT/PEDIATRIC) - ASU DC SPECIAL INSTRUCTIONSFT
1. May shower over steri strips. The strips will fall off on their own.  2. May take over the counter Tylenol for pain control.  3. Please follow up with Dr. Corral in his office in 1 week.

## 2019-12-18 NOTE — ASU DISCHARGE PLAN (ADULT/PEDIATRIC) - FOLLOW UP APPOINTMENTS
[facility] ([facility]): [phone] or go to the nearest Emergency Room

## 2019-12-18 NOTE — BRIEF OPERATIVE NOTE - OPERATION/FINDINGS
Patient placed in left lateral decubitus. Right flank prepared and draped in sterile fashion. Incision and dissection of lipoma performed. Complete excision of lipoma with adequate hemostasis achieved. Primary closure and dressed with steri strips.

## 2019-12-24 LAB — SURGICAL PATHOLOGY STUDY: SIGNIFICANT CHANGE UP

## 2019-12-31 ENCOUNTER — APPOINTMENT (OUTPATIENT)
Dept: SURGERY | Facility: CLINIC | Age: 49
End: 2019-12-31
Payer: COMMERCIAL

## 2019-12-31 VITALS
OXYGEN SATURATION: 98 % | DIASTOLIC BLOOD PRESSURE: 77 MMHG | TEMPERATURE: 98.2 F | HEART RATE: 76 BPM | RESPIRATION RATE: 16 BRPM | SYSTOLIC BLOOD PRESSURE: 113 MMHG

## 2019-12-31 DIAGNOSIS — Z86.018 PERSONAL HISTORY OF OTHER BENIGN NEOPLASM: ICD-10-CM

## 2019-12-31 DIAGNOSIS — Z98.890 OTHER SPECIFIED POSTPROCEDURAL STATES: ICD-10-CM

## 2019-12-31 PROCEDURE — 99024 POSTOP FOLLOW-UP VISIT: CPT

## 2019-12-31 NOTE — HISTORY OF PRESENT ILLNESS
[de-identified] : Virgil is a 48 y/o female here for post-operative visit. 12/18/19- resection of 5 cm lipoma of the back. Pathology was given to the patient.

## 2022-11-21 NOTE — ASU PATIENT PROFILE, ADULT - NS PRO TALK SOMEONE YN
no Interpolation Flap Text: A decision was made to reconstruct the defect utilizing an interpolation axial flap and a staged reconstruction.  A telfa template was made of the defect.  This telfa template was then used to outline the interpolation flap.  The donor area for the pedicle flap was then injected with anesthesia.  The flap was excised through the skin and subcutaneous tissue down to the layer of the underlying musculature.  The interpolation flap was carefully excised within this deep plane to maintain its blood supply.  The edges of the donor site were undermined.   The donor site was closed in a primary fashion.  The pedicle was then rotated into position and sutured.  Once the tube was sutured into place, adequate blood supply was confirmed with blanching and refill.  The pedicle was then wrapped with xeroform gauze and dressed appropriately with a telfa and gauze bandage to ensure continued blood supply and protect the attached pedicle.

## 2023-05-10 NOTE — H&P PST ADULT - NEGATIVE RESPIRATORY AND THORAX SYMPTOMS
no dyspnea/no cough/no wheezing

## 2024-01-12 NOTE — ASU DISCHARGE PLAN (ADULT/PEDIATRIC) - CARE PROVIDER_API CALL
Kp Corral)  Surgery  [address], [address]  [place] [address]  Phone: [phone]  Fax: [phone]  Follow Up Time: 1 week
Name band;

## 2024-06-04 NOTE — ASSESSMENT
"Pt calling in for US results, I reviewed [employer] message with pt from Dr. Torres. She understood and will call CS to schedule MRI. No further questions   Reason for Disposition  • Information only question and nurse able to answer    Answer Assessment - Initial Assessment Questions  1. REASON FOR CALL or QUESTION: \"What is your reason for calling today?\" or \"How can I best help you?\" or \"What question do you have that I can help answer?\"      See notes    Protocols used: Information Only Call - No Triage-ADULT-[place]    " [FreeTextEntry1] : Wound care was discussed with the patient

## 2024-10-18 NOTE — H&P PST ADULT - GAIT/BALANCE
Patient received to intake A&Ox4. pt endorsing RLQ pain associated with dysuria x 1 week. Pt denies fevers, chills, nausea, vomiting, headache, dizziness, SOB verbalized. respirations even and unlabored. abdomen soft nondistended. 20G IV to right ac, labs collected. pending CT. safety maintained, call bell in reach
steady gait, denies falls in last 6 months